# Patient Record
Sex: FEMALE
[De-identification: names, ages, dates, MRNs, and addresses within clinical notes are randomized per-mention and may not be internally consistent; named-entity substitution may affect disease eponyms.]

---

## 2018-11-29 ENCOUNTER — HOSPITAL ENCOUNTER (OUTPATIENT)
Dept: HOSPITAL 88 - ER | Age: 63
Setting detail: OBSERVATION
LOS: 2 days | Discharge: HOME | End: 2018-12-01
Attending: INTERNAL MEDICINE | Admitting: INTERNAL MEDICINE
Payer: COMMERCIAL

## 2018-11-29 VITALS — SYSTOLIC BLOOD PRESSURE: 175 MMHG | DIASTOLIC BLOOD PRESSURE: 74 MMHG

## 2018-11-29 VITALS — BODY MASS INDEX: 42.6 KG/M2 | HEIGHT: 59 IN | WEIGHT: 211.31 LBS

## 2018-11-29 DIAGNOSIS — I34.0: ICD-10-CM

## 2018-11-29 DIAGNOSIS — Z88.8: ICD-10-CM

## 2018-11-29 DIAGNOSIS — Z79.84: ICD-10-CM

## 2018-11-29 DIAGNOSIS — E78.1: ICD-10-CM

## 2018-11-29 DIAGNOSIS — E11.9: ICD-10-CM

## 2018-11-29 DIAGNOSIS — R91.1: ICD-10-CM

## 2018-11-29 DIAGNOSIS — I37.1: ICD-10-CM

## 2018-11-29 DIAGNOSIS — I25.110: Primary | ICD-10-CM

## 2018-11-29 DIAGNOSIS — E66.01: ICD-10-CM

## 2018-11-29 DIAGNOSIS — G47.00: ICD-10-CM

## 2018-11-29 DIAGNOSIS — E78.5: ICD-10-CM

## 2018-11-29 DIAGNOSIS — I10: ICD-10-CM

## 2018-11-29 DIAGNOSIS — K21.9: ICD-10-CM

## 2018-11-29 DIAGNOSIS — I07.1: ICD-10-CM

## 2018-11-29 DIAGNOSIS — Z79.02: ICD-10-CM

## 2018-11-29 LAB
ALBUMIN SERPL-MCNC: 3.4 G/DL (ref 3.5–5)
ALBUMIN/GLOB SERPL: 0.9 {RATIO} (ref 0.8–2)
ALP SERPL-CCNC: 59 IU/L (ref 40–150)
ALT SERPL-CCNC: 16 IU/L (ref 0–55)
ANION GAP SERPL CALC-SCNC: 13.2 MMOL/L (ref 8–16)
BACTERIA URNS QL MICRO: (no result) /HPF
BASOPHILS # BLD AUTO: 0.1 10*3/UL (ref 0–0.1)
BASOPHILS NFR BLD AUTO: 0.6 % (ref 0–1)
BILIRUB UR QL: NEGATIVE
BUN SERPL-MCNC: 25 MG/DL (ref 7–26)
BUN/CREAT SERPL: 28 (ref 6–25)
CALCIUM SERPL-MCNC: 9.3 MG/DL (ref 8.4–10.2)
CHLORIDE SERPL-SCNC: 103 MMOL/L (ref 98–107)
CK MB SERPL-MCNC: 0.9 NG/ML (ref 0–5)
CK SERPL-CCNC: 33 IU/L (ref 29–168)
CLARITY UR: CLEAR
CO2 SERPL-SCNC: 26 MMOL/L (ref 22–29)
COLOR UR: YELLOW
DEPRECATED APTT PLAS QN: 25.6 SECONDS (ref 23.8–35.5)
DEPRECATED INR PLAS: 0.92
DEPRECATED NEUTROPHILS # BLD AUTO: 4.7 10*3/UL (ref 2.1–6.9)
DEPRECATED RBC URNS MANUAL-ACNC: (no result) /HPF (ref 0–5)
EGFRCR SERPLBLD CKD-EPI 2021: > 60 ML/MIN (ref 60–?)
EOSINOPHIL # BLD AUTO: 0.2 10*3/UL (ref 0–0.4)
EOSINOPHIL NFR BLD AUTO: 2.3 % (ref 0–6)
EPI CELLS URNS QL MICRO: (no result) /LPF
ERYTHROCYTE [DISTWIDTH] IN CORD BLOOD: 12.5 % (ref 11.7–14.4)
GLOBULIN PLAS-MCNC: 3.6 G/DL (ref 2.3–3.5)
GLUCOSE SERPLBLD-MCNC: 116 MG/DL (ref 74–118)
HCT VFR BLD AUTO: 33.7 % (ref 34.2–44.1)
HGB BLD-MCNC: 11.5 G/DL (ref 12–16)
KETONES UR QL STRIP.AUTO: NEGATIVE
LEUKOCYTE ESTERASE UR QL STRIP.AUTO: NEGATIVE
LYMPHOCYTES # BLD: 3.1 10*3/UL (ref 1–3.2)
LYMPHOCYTES NFR BLD AUTO: 36.1 % (ref 18–39.1)
MAGNESIUM SERPL-MCNC: 1.3 MG/DL (ref 1.3–2.1)
MCH RBC QN AUTO: 31.2 PG (ref 28–32)
MCHC RBC AUTO-ENTMCNC: 34.1 G/DL (ref 31–35)
MCV RBC AUTO: 91.3 FL (ref 81–99)
MONOCYTES # BLD AUTO: 0.5 10*3/UL (ref 0.2–0.8)
MONOCYTES NFR BLD AUTO: 5.5 % (ref 4.4–11.3)
NEUTS SEG NFR BLD AUTO: 55.1 % (ref 38.7–80)
NITRITE UR QL STRIP.AUTO: NEGATIVE
PLATELET # BLD AUTO: 319 X10E3/UL (ref 140–360)
POTASSIUM SERPL-SCNC: 4.2 MMOL/L (ref 3.5–5.1)
PROT UR QL STRIP.AUTO: (no result)
PROTHROMBIN TIME: 13.2 SECONDS (ref 11.9–14.5)
RBC # BLD AUTO: 3.69 X10E6/UL (ref 3.6–5.1)
SODIUM SERPL-SCNC: 138 MMOL/L (ref 136–145)
SP GR UR STRIP: 1.02 (ref 1.01–1.02)
UROBILINOGEN UR STRIP-MCNC: 0.2 MG/DL (ref 0.2–1)
WBC #/AREA URNS HPF: (no result) /HPF (ref 0–5)

## 2018-11-29 PROCEDURE — 81001 URINALYSIS AUTO W/SCOPE: CPT

## 2018-11-29 PROCEDURE — 84484 ASSAY OF TROPONIN QUANT: CPT

## 2018-11-29 PROCEDURE — 71260 CT THORAX DX C+: CPT

## 2018-11-29 PROCEDURE — 85379 FIBRIN DEGRADATION QUANT: CPT

## 2018-11-29 PROCEDURE — 80061 LIPID PANEL: CPT

## 2018-11-29 PROCEDURE — 99284 EMERGENCY DEPT VISIT MOD MDM: CPT

## 2018-11-29 PROCEDURE — 80053 COMPREHEN METABOLIC PANEL: CPT

## 2018-11-29 PROCEDURE — 93005 ELECTROCARDIOGRAM TRACING: CPT

## 2018-11-29 PROCEDURE — 93306 TTE W/DOPPLER COMPLETE: CPT

## 2018-11-29 PROCEDURE — 82553 CREATINE MB FRACTION: CPT

## 2018-11-29 PROCEDURE — 93017 CV STRESS TEST TRACING ONLY: CPT

## 2018-11-29 PROCEDURE — 83735 ASSAY OF MAGNESIUM: CPT

## 2018-11-29 PROCEDURE — 71045 X-RAY EXAM CHEST 1 VIEW: CPT

## 2018-11-29 PROCEDURE — 87086 URINE CULTURE/COLONY COUNT: CPT

## 2018-11-29 PROCEDURE — 36415 COLL VENOUS BLD VENIPUNCTURE: CPT

## 2018-11-29 PROCEDURE — 85730 THROMBOPLASTIN TIME PARTIAL: CPT

## 2018-11-29 PROCEDURE — 82948 REAGENT STRIP/BLOOD GLUCOSE: CPT

## 2018-11-29 PROCEDURE — 96372 THER/PROPH/DIAG INJ SC/IM: CPT

## 2018-11-29 PROCEDURE — 85025 COMPLETE CBC W/AUTO DIFF WBC: CPT

## 2018-11-29 PROCEDURE — 92928 PRQ TCAT PLMT NTRAC ST 1 LES: CPT

## 2018-11-29 PROCEDURE — 85610 PROTHROMBIN TIME: CPT

## 2018-11-29 PROCEDURE — 83880 ASSAY OF NATRIURETIC PEPTIDE: CPT

## 2018-11-29 PROCEDURE — 93458 L HRT ARTERY/VENTRICLE ANGIO: CPT

## 2018-11-29 PROCEDURE — 82550 ASSAY OF CK (CPK): CPT

## 2018-11-29 NOTE — XMS REPORT
Patient Summary Document

                             Created on: 2018



SOMMER VARGAS

External Reference #: [id number]

: 1955

Sex: Female



Demographics







                          Address                   [address] [place] [address]

 

                          Home Phone                [phone]

 

                          Preferred Language        Unknown

 

                          Marital Status            Unknown

 

                          Catholic Affiliation     Unknown

 

                          Race                      Unknown

 

                                        Additional Race(s)  

 

                          Ethnic Group              Unknown





Author







                          Author                    [facility]

 

                          Address                   Unknown

 

                          Phone                     Unavailable







Care Team Providers







                    Care Team Member Name    Role                Phone

 

                    SWEET, A LAIRD      Unavailable         Unavailable







Problems

This patient has no known problems.



Allergies, Adverse Reactions, Alerts

This patient has no known allergies or adverse reactions.



Medications

This patient has no known medications.



Results







           Test Description    Test Time    Test Comments    Text Results    Atomic Results    Result

 Comments

 

                CT CHEST W      2018 21:50:00                                                                

                                          [facility]     
                  [address] [address]     
Patient Name: SOMMER VARGAS                                   MR #: [id number] 
                   : 1955                                   Age/Sex: 
63/F  Acct #: [id number]                              Req #: [id number]  Adm 
Physician:                                                      Ordered by: 
KENDAL OROPEZA MD                            Report #: [id number]        
Location: ER                                      Room/Bed:                     
___________________________________________________________________________________________________
   Procedure: [id number] CT/CT CHEST W  Exam Date: 18                      
     Exam Time:                                               REPORT STATUS:
Signed    EXAM: CT CHEST W   INDICATION:  Shortness of breath and positive d-
dimer   COMPARISON:  None   TECHNIQUE: Multidetector CT scanning of the chest 
was performed.  Coronal and   sagittal multiplanar reformations were obtained. 
Dose modulation, iterative   reconstruction, and/or weight based adjustment of 
the mA/kV was utilized to   reduce the radiation dose to as low as reasonably 
achievable. PE protocol   performed.   IV Contrast: 100 cc Isovue-370   CTDIvol 
has been reviewed. It is below the limits set by the Radiation Protocol   
Committee (RPC).      FINDINGS:   LUNGS AND AIRWAYS: The trachea and major 
bronchi are unremarkable.   Bilateral mosaic attenuation of the lungs, most 
likely related to air trapping.   At the medial aspect of the right lung base 
there is a 1.2 cm   consolidation/nodule.       PLEURA: No effusions or 
pneumothorax.      HEART, MEDIASTINUM, VESSELS: Mild cardiac enlargement. No 
abnormal pericardial   effusion. Atherosclerotic changes of the thoracic aorta 
without aneurysm. No   mediastinal mass or lymphadenopathy. The main pulmonary 
artery is within normal   size limits. No evidence of a pulmonary embolism.     
UPPER ABDOMEN: Cholecystectomy.      MUSCULOSKELETAL: No acute findings. Severe 
degenerative changes of the left   shoulder.      IMPRESSION:      1. No 
evidence of a pulmonary embolism.      2. At the medial aspect of the right lung
base there is an approximately 1.2 cm   consolidation/sub-solid nodule. This is 
indeterminate for being from prior   pneumonia or a potential cancer. A follow-
up CT of the chest without IV   contrast, low-dose protocol is recommended in 3 
months if there are no prior   CTs at an outside facility available for 
comparison.      Signed by: Dr. Bebe Dawn M.D. on 2018 10:00 PM  
     Dictated By: BEBE DAWN MD  Electronically Signed By: BEBE DAWN MD 
on 18  Transcribed By: ANTONIO on 18       COPY TO:   
KENDAL OROPEZA MD                         

 

                CHEST SINGLE (PORTABLE)    2018 21:12:00                                                   

                                                       [address] [place] [address]      Patient Name: SOMMER VARGAS                         
         MR #: [id number]                     : 1955                   
               Age/Sex: 63/F  Acct #: [id number]                              
Req #: [id number]  Adm Physician:                                               
      Ordered by: KENDAL OROPEZA MD                            Report #: [id number]
[id number]        Location: ER                                      Room/Bed:         
           
___________________________________________________________________________________________________
   Procedure: [id number] DX/CHEST SINGLE (PORTABLE)  Exam Date: 18         
                  Exam Time:                                               
REPORT STATUS: Signed    EXAM: CHEST SINGLE (PORTABLE), AP 1 view   INDICATION: 
Chest pain   COMPARISON: AP view of the chest 2015      FINDINGS:   
LINES/TUBES: None      LUNGS: No consolidations or edema.       PLEURA: No 
effusions or pneumothorax.      HEART AND MEDIASTINUM: Normal size and contour. 
    BONES AND SOFT TISSUES: No acute findings.       IMPRESSION:   No acute 
thoracic abnormality.            Signed by: Dr. Bebe Dawn M.D. on 
2018 9:13 PM        Dictated By: BEBE DAWN MD  Electronically Signed 
By: BEBE DAWN MD on 18  Transcribed By: ANTONIO on 18 
     COPY TO:   KENDAL OROPEZA MD

## 2018-11-29 NOTE — DIAGNOSTIC IMAGING REPORT
EXAM: CHEST SINGLE (PORTABLE), AP 1 view

INDICATION: Chest pain

COMPARISON: AP view of the chest March 4, 2015



FINDINGS:

LINES/TUBES: None



LUNGS: No consolidations or edema. 



PLEURA: No effusions or pneumothorax.



HEART AND MEDIASTINUM: Normal size and contour.



BONES AND SOFT TISSUES: No acute findings. 



IMPRESSION:

No acute thoracic abnormality.







Signed by: Dr. Susanna Riley M.D. on 11/29/2018 9:13 PM

## 2018-11-29 NOTE — DIAGNOSTIC IMAGING REPORT
EXAM: CT CHEST W

INDICATION:  Shortness of breath and positive d-dimer

COMPARISON:  None

TECHNIQUE: Multidetector CT scanning of the chest was performed.  Coronal and

sagittal multiplanar reformations were obtained. Dose modulation, iterative

reconstruction, and/or weight based adjustment of the mA/kV was utilized to

reduce the radiation dose to as low as reasonably achievable. PE protocol

performed.

IV Contrast: 100 cc Isovue-370

CTDIvol has been reviewed. It is below the limits set by the Radiation Protocol

Committee (RPC).



FINDINGS:

LUNGS AND AIRWAYS: The trachea and major bronchi are unremarkable.

Bilateral mosaic attenuation of the lungs, most likely related to air trapping.

At the medial aspect of the right lung base there is a 1.2 cm

consolidation/nodule. 



PLEURA: No effusions or pneumothorax.



HEART, MEDIASTINUM, VESSELS: Mild cardiac enlargement. No abnormal pericardial

effusion. Atherosclerotic changes of the thoracic aorta without aneurysm. No

mediastinal mass or lymphadenopathy. The main pulmonary artery is within normal

size limits. No evidence of a pulmonary embolism.



UPPER ABDOMEN: Cholecystectomy.



MUSCULOSKELETAL: No acute findings. Severe degenerative changes of the left

shoulder.



IMPRESSION:



1. No evidence of a pulmonary embolism.



2. At the medial aspect of the right lung base there is an approximately 1.2 cm

consolidation/sub-solid nodule. This is indeterminate for being from prior

pneumonia or a potential cancer. A follow-up CT of the chest without IV

contrast, low-dose protocol is recommended in 3 months if there are no prior

CTs at an outside facility available for comparison.



Signed by: Dr. Susanna Riley M.D. on 11/29/2018 10:00 PM

## 2018-11-30 VITALS — SYSTOLIC BLOOD PRESSURE: 181 MMHG | DIASTOLIC BLOOD PRESSURE: 78 MMHG

## 2018-11-30 VITALS — SYSTOLIC BLOOD PRESSURE: 183 MMHG | DIASTOLIC BLOOD PRESSURE: 91 MMHG

## 2018-11-30 VITALS — SYSTOLIC BLOOD PRESSURE: 142 MMHG | DIASTOLIC BLOOD PRESSURE: 72 MMHG

## 2018-11-30 VITALS — DIASTOLIC BLOOD PRESSURE: 68 MMHG | SYSTOLIC BLOOD PRESSURE: 168 MMHG

## 2018-11-30 VITALS — SYSTOLIC BLOOD PRESSURE: 191 MMHG | DIASTOLIC BLOOD PRESSURE: 82 MMHG

## 2018-11-30 VITALS — SYSTOLIC BLOOD PRESSURE: 159 MMHG | DIASTOLIC BLOOD PRESSURE: 84 MMHG

## 2018-11-30 VITALS — DIASTOLIC BLOOD PRESSURE: 72 MMHG | SYSTOLIC BLOOD PRESSURE: 164 MMHG

## 2018-11-30 VITALS — DIASTOLIC BLOOD PRESSURE: 74 MMHG | SYSTOLIC BLOOD PRESSURE: 170 MMHG

## 2018-11-30 VITALS — DIASTOLIC BLOOD PRESSURE: 89 MMHG | SYSTOLIC BLOOD PRESSURE: 167 MMHG

## 2018-11-30 VITALS — SYSTOLIC BLOOD PRESSURE: 184 MMHG | DIASTOLIC BLOOD PRESSURE: 79 MMHG

## 2018-11-30 VITALS — DIASTOLIC BLOOD PRESSURE: 74 MMHG | SYSTOLIC BLOOD PRESSURE: 175 MMHG

## 2018-11-30 VITALS — SYSTOLIC BLOOD PRESSURE: 153 MMHG | DIASTOLIC BLOOD PRESSURE: 74 MMHG

## 2018-11-30 VITALS — DIASTOLIC BLOOD PRESSURE: 83 MMHG | SYSTOLIC BLOOD PRESSURE: 158 MMHG

## 2018-11-30 VITALS — DIASTOLIC BLOOD PRESSURE: 79 MMHG | SYSTOLIC BLOOD PRESSURE: 172 MMHG

## 2018-11-30 VITALS — SYSTOLIC BLOOD PRESSURE: 178 MMHG | DIASTOLIC BLOOD PRESSURE: 139 MMHG

## 2018-11-30 VITALS — SYSTOLIC BLOOD PRESSURE: 192 MMHG | DIASTOLIC BLOOD PRESSURE: 76 MMHG

## 2018-11-30 VITALS — DIASTOLIC BLOOD PRESSURE: 87 MMHG | SYSTOLIC BLOOD PRESSURE: 188 MMHG

## 2018-11-30 VITALS — SYSTOLIC BLOOD PRESSURE: 169 MMHG | DIASTOLIC BLOOD PRESSURE: 72 MMHG

## 2018-11-30 LAB
CHOLEST SERPL-MCNC: 211 MD/DL (ref 0–199)
CHOLEST/HDLC SERPL: 6.4 {RATIO} (ref 3–3.6)
CK MB SERPL-MCNC: 0.6 NG/ML (ref 0–5)
CK SERPL-CCNC: 26 IU/L (ref 29–168)
HDLC SERPL-MSCNC: 33 MG/DL (ref 40–60)
LDLC SERPL CALC-MCNC: 130 MG/DL (ref 60–130)
TRIGL SERPL-MCNC: 240 MG/DL (ref 0–149)

## 2018-11-30 RX ADMIN — Medication PRN MG: at 18:03

## 2018-11-30 RX ADMIN — PANTOPRAZOLE SODIUM SCH MG: 40 GRANULE, DELAYED RELEASE ORAL at 13:33

## 2018-11-30 RX ADMIN — INSULIN LISPRO SCH UNIT: 100 INJECTION, SOLUTION INTRAVENOUS; SUBCUTANEOUS at 22:20

## 2018-11-30 RX ADMIN — INSULIN LISPRO SCH UNIT: 100 INJECTION, SOLUTION INTRAVENOUS; SUBCUTANEOUS at 07:56

## 2018-11-30 RX ADMIN — SODIUM CHLORIDE PRN MG: 900 INJECTION INTRAVENOUS at 01:43

## 2018-11-30 RX ADMIN — INSULIN LISPRO SCH UNIT: 100 INJECTION, SOLUTION INTRAVENOUS; SUBCUTANEOUS at 13:32

## 2018-11-30 RX ADMIN — SODIUM CHLORIDE SCH MLS/HR: 9 INJECTION, SOLUTION INTRAVENOUS at 15:22

## 2018-11-30 RX ADMIN — SODIUM CHLORIDE SCH MLS/HR: 9 INJECTION, SOLUTION INTRAVENOUS at 13:32

## 2018-11-30 RX ADMIN — INSULIN LISPRO SCH UNIT: 100 INJECTION, SOLUTION INTRAVENOUS; SUBCUTANEOUS at 16:30

## 2018-11-30 RX ADMIN — Medication PRN MG: at 23:17

## 2018-11-30 RX ADMIN — PANTOPRAZOLE SODIUM SCH MG: 40 GRANULE, DELAYED RELEASE ORAL at 15:21

## 2018-11-30 RX ADMIN — SODIUM CHLORIDE SCH MLS/HR: 9 INJECTION, SOLUTION INTRAVENOUS at 21:00

## 2018-11-30 RX ADMIN — Medication PRN MG: at 01:50

## 2018-11-30 RX ADMIN — SODIUM CHLORIDE PRN MG: 900 INJECTION INTRAVENOUS at 18:03

## 2018-11-30 NOTE — CARDIOLOGY REPORT
DATE OF STUDY:  November 30, 2018



EXERCISE TREADMILL STRESS TEST



INDICATION FOR STUDY:  Chest pain, escalating symptoms concerning for 

perhaps unstable angina.



TECHNICAL DETAILS:  After risks, benefits, pros and cons of today's 

exercise treadmill stress test were explained to the patient, the patient 

agreed to proceed.  Patient was brought to the stress lab where 12-lead EKG 

monitoring and blood pressure monitoring were obtained.  She exercised on a 

Bryon protocol for a total duration of 1 minute and 50 seconds and had to 

be terminated sub-max due to severe onset of chest pain, pressure similar 

to presenting complaint.  Underlying EKG revealed normal sinus rhythm, 

right bundle branch block, and heart rate increased from a baseline of 79 

beats per minute to a maximum of 98 beats per minute well below our target 

heart rate goal.  Blood pressure was only obtained at baseline, which was 

noted to be 126/78.  Repeat blood pressure was not done during exercise.  

At the end of stress test, patient had borderline ST depressions in V5-V6 

and again intense chest pain typical of angina.



CONCLUSION

1. Abnormal exercise treadmill stress test, sub-max protocol, with the 

development of typical chest pain symptoms concerning for angina, which 

brought her into the hospital.

2. Borderline ischemic EKG changes and a very sub-max stress test.

3. In light of high risk features, we will proceed with cardiac 

catheterization.









DD:  11/30/2018 19:58

DT:  11/30/2018 21:27

Job#:  [id number] GIOVANNI

## 2018-11-30 NOTE — CONSULTATION
DATE OF CONSULTATION:  2018 



REASON FOR CONSULTATION:  Chest pain.



This is a 63-year-old female with a history of diabetes, hypertension, 

reflux, and insomnia.  Patient presents to Patients ER with complaints of 

chest pain times 1 week.  However, on  the chest pain became 

constant, pressure in nature with some shortness of breath.  Cardiology was 

consulted to evaluate the patient.  Patient was seen in the ER.  Reports 

the chest pain started about a week ago when she was on vacation and states 

that it is pressure sensation, worse with exertion.  However, on  

reports chest pain became constant pressure worse with deep breathing.  

Therefore, came to the ER for further evaluation.  Initial enzymes negative 

times 2.  EKG sinus rhythm with right bundle branch block.  CT was done PE 

protocol, which was negative.  Echo has been ordered and is pending.  Of 

note, the patient had a heart cath several years ago with normal 

coronaries.  Patient is very concerned and is wanting further evaluation 

given her strong family history of heart disease.



PAST MEDICAL HISTORY:  Diabetes, hypertension, GERD, and insomnia.



SURGICAL HISTORY:  Hysterectomy, cholecystectomy, umbilical hernia, 

appendectomy.  



FAMILY HISTORY:  Mother  at age 92 apparently with uterine cancer.  

Father  in his 50s apparently from MI.  



SOCIAL HISTORY:  She is .  She has 2 children.  Reports they are 

healthy.  No alcohol use.  No tobacco use.  



HOME MEDICATIONS

1.  Protonix 40 mg once a day.  

2.  Metformin 500 mg t.i.d. 

3.  Ambien p.r.n. 

4.  Nifedipine 30 mg once a day.



ALLERGIES:  LISINOPRIL.



REVIEW OF SYSTEMS

GENERAL:  Denies any weight changes, any fevers, chills, night sweats, or 

any skin contacts.

SKIN:  No rashes or sores. 

HEENT:  No nausea, vomiting, vision changes, blurred vision, double vision, 

tinnitus, earaches, epistaxis.

CARDIAC:  Positive for chest pain.  Positive for dyspnea on exertion 

worsening of several days.  Denies any orthopnea, PND, intermittent lower 

extremity edema.  

RESPIRATORY:  Positive shortness of breath.  No hemoptysis.

GI:  Good appetite.  No nausea.  No vomiting.  No diarrhea.  No 

constipation.  Denies any melena or hematochezia.  

URINARY:  Denies any frequency, urgency, nocturia, or dysuria.  

VASCULAR:  Reports intermittent lower extremity edema, left greater than 

right.  Denies any claudication also.  

MUSCULOSKELETAL:  Denies any back pain.  However, generalized joint pains.  



NEUROLOGIC:  Denies any tingling, tremors, paralysis, blackouts, seizures.

HEMATOLOGY:  Denies anemia, any bruising.  

ENDOCRINE:  Denies any heat or cold intolerance, any polyuria, polydipsia, 

or polyphagia. 



PHYSICAL EXAMINATION 

VITALS:  Height 59 inches, weight 208 pounds.  BMI 42.  Vital signs 

currently are heart rate 70, temperature 98.4, respiratory rate 18, blood 

pressure 126/50, pulse ox 100%, on room air.

GENERAL:  Appears stated age and reliable informant.  In no acute distress. 

 

SKIN:  No rashes or bruises noted. 

HEENT:  Normocephalic.  Pupils are equal and reactive.  Extraocular motor 

intact.  Trachea midline.  No thyromegaly noted.  No JVD.  No carotid 

bruits.  Oral mucosa pink. 

HEART:  Regular rate and rhythm.  

LUNGS:  Bilateral breath sounds clear to auscultation. 

ABDOMEN:  Soft, nontender and nondistended.  No organomegaly.  However, the 

patient is very obese.  

EXTREMITIES:  Muscle tone and good muscle strength throughout.  

VASCULAR:  Plus 2 bilateral radial pulses and +1 DP and PT pulses 

bilaterally.  

NEUROLOGIC:  Cranial nerves II-XII seem intact.



LABS:  Hemoglobin 11, hematocrit 33 and platelets 319,000.  Sodium 138, 

potassium 4.2,  chloride 103, BUN 25, creatinine 0.8.  Troponin 0.036, next 

0.018.  BNP 98.  EKG is normal sinus rhythm with right bundle branch block. 

 Chest x-ray with no PE.  However, showing a right lower lung base 1.2-cm 

nodule.  Chest x-ray with no acute abnormalities noted.



ASSESSMENT AND PLAN 

1.  Chest pain.  

2.  Pulmonary nodule.  

3.  Diabetes.

4.  Obesity.

5.  Hyperlipidemia.  



PLAN:  The patient presents with chest pain for about a week.  Now, reports 

it is constant with mixed features.  Enzymes negative times 2 thus far and 

EKG without any acute changes suggestive of acute event.  The patient is 

requesting further evaluation.  Patient very concerned.  Will proceed with 

a stress test to evaluate further.  _______ has been ordered and will be 

reviewed by cardiology attending.  The patient is aware of the CT report 

and copy has been given to the patient in which she was advised to follow 

up as recommended.  Continue home medications.  Further recommendations as 

clinical course dictates.  





Thank you very much for this consult.



DICTATED BY CONCHITA BARBOZA NP

Seen and evaluated

Agree with note

Since enzymes are negative , we will do stress test

Stress test is positive with chest pain at 2 minutes of excercise ===> will 
schedule cardiac cath / PCI, 



 





DD:  2018 12:38

DT:  2018 13:30

Job#:  [id number] RI







MTDD

## 2018-12-01 VITALS — SYSTOLIC BLOOD PRESSURE: 158 MMHG | DIASTOLIC BLOOD PRESSURE: 79 MMHG

## 2018-12-01 VITALS — DIASTOLIC BLOOD PRESSURE: 57 MMHG | SYSTOLIC BLOOD PRESSURE: 144 MMHG

## 2018-12-01 VITALS — SYSTOLIC BLOOD PRESSURE: 143 MMHG | DIASTOLIC BLOOD PRESSURE: 75 MMHG

## 2018-12-01 VITALS — SYSTOLIC BLOOD PRESSURE: 154 MMHG | DIASTOLIC BLOOD PRESSURE: 74 MMHG

## 2018-12-01 VITALS — DIASTOLIC BLOOD PRESSURE: 72 MMHG | SYSTOLIC BLOOD PRESSURE: 145 MMHG

## 2018-12-01 VITALS — DIASTOLIC BLOOD PRESSURE: 69 MMHG | SYSTOLIC BLOOD PRESSURE: 142 MMHG

## 2018-12-01 VITALS — DIASTOLIC BLOOD PRESSURE: 61 MMHG | SYSTOLIC BLOOD PRESSURE: 150 MMHG

## 2018-12-01 VITALS — DIASTOLIC BLOOD PRESSURE: 72 MMHG | SYSTOLIC BLOOD PRESSURE: 149 MMHG

## 2018-12-01 VITALS — DIASTOLIC BLOOD PRESSURE: 81 MMHG | SYSTOLIC BLOOD PRESSURE: 149 MMHG

## 2018-12-01 VITALS — DIASTOLIC BLOOD PRESSURE: 60 MMHG | SYSTOLIC BLOOD PRESSURE: 137 MMHG

## 2018-12-01 VITALS — DIASTOLIC BLOOD PRESSURE: 80 MMHG | SYSTOLIC BLOOD PRESSURE: 152 MMHG

## 2018-12-01 VITALS — SYSTOLIC BLOOD PRESSURE: 159 MMHG | DIASTOLIC BLOOD PRESSURE: 68 MMHG

## 2018-12-01 VITALS — DIASTOLIC BLOOD PRESSURE: 77 MMHG | SYSTOLIC BLOOD PRESSURE: 159 MMHG

## 2018-12-01 VITALS — SYSTOLIC BLOOD PRESSURE: 145 MMHG | DIASTOLIC BLOOD PRESSURE: 71 MMHG

## 2018-12-01 VITALS — DIASTOLIC BLOOD PRESSURE: 63 MMHG | SYSTOLIC BLOOD PRESSURE: 137 MMHG

## 2018-12-01 VITALS — DIASTOLIC BLOOD PRESSURE: 93 MMHG | SYSTOLIC BLOOD PRESSURE: 165 MMHG

## 2018-12-01 VITALS — SYSTOLIC BLOOD PRESSURE: 136 MMHG | DIASTOLIC BLOOD PRESSURE: 68 MMHG

## 2018-12-01 VITALS — DIASTOLIC BLOOD PRESSURE: 72 MMHG | SYSTOLIC BLOOD PRESSURE: 160 MMHG

## 2018-12-01 VITALS — SYSTOLIC BLOOD PRESSURE: 153 MMHG | DIASTOLIC BLOOD PRESSURE: 69 MMHG

## 2018-12-01 VITALS — DIASTOLIC BLOOD PRESSURE: 68 MMHG | SYSTOLIC BLOOD PRESSURE: 139 MMHG

## 2018-12-01 VITALS — DIASTOLIC BLOOD PRESSURE: 57 MMHG | SYSTOLIC BLOOD PRESSURE: 87 MMHG

## 2018-12-01 VITALS — DIASTOLIC BLOOD PRESSURE: 65 MMHG | SYSTOLIC BLOOD PRESSURE: 156 MMHG

## 2018-12-01 VITALS — SYSTOLIC BLOOD PRESSURE: 155 MMHG | DIASTOLIC BLOOD PRESSURE: 63 MMHG

## 2018-12-01 VITALS — SYSTOLIC BLOOD PRESSURE: 164 MMHG | DIASTOLIC BLOOD PRESSURE: 68 MMHG

## 2018-12-01 VITALS — SYSTOLIC BLOOD PRESSURE: 138 MMHG | DIASTOLIC BLOOD PRESSURE: 59 MMHG

## 2018-12-01 VITALS — SYSTOLIC BLOOD PRESSURE: 142 MMHG | DIASTOLIC BLOOD PRESSURE: 66 MMHG

## 2018-12-01 VITALS — DIASTOLIC BLOOD PRESSURE: 76 MMHG | SYSTOLIC BLOOD PRESSURE: 148 MMHG

## 2018-12-01 VITALS — SYSTOLIC BLOOD PRESSURE: 164 MMHG | DIASTOLIC BLOOD PRESSURE: 71 MMHG

## 2018-12-01 VITALS — SYSTOLIC BLOOD PRESSURE: 142 MMHG | DIASTOLIC BLOOD PRESSURE: 67 MMHG

## 2018-12-01 VITALS — SYSTOLIC BLOOD PRESSURE: 155 MMHG | DIASTOLIC BLOOD PRESSURE: 68 MMHG

## 2018-12-01 VITALS — SYSTOLIC BLOOD PRESSURE: 169 MMHG | DIASTOLIC BLOOD PRESSURE: 86 MMHG

## 2018-12-01 VITALS — DIASTOLIC BLOOD PRESSURE: 68 MMHG | SYSTOLIC BLOOD PRESSURE: 137 MMHG

## 2018-12-01 VITALS — DIASTOLIC BLOOD PRESSURE: 74 MMHG | SYSTOLIC BLOOD PRESSURE: 154 MMHG

## 2018-12-01 VITALS — DIASTOLIC BLOOD PRESSURE: 75 MMHG | SYSTOLIC BLOOD PRESSURE: 167 MMHG

## 2018-12-01 VITALS — SYSTOLIC BLOOD PRESSURE: 150 MMHG | DIASTOLIC BLOOD PRESSURE: 70 MMHG

## 2018-12-01 VITALS — SYSTOLIC BLOOD PRESSURE: 146 MMHG | DIASTOLIC BLOOD PRESSURE: 69 MMHG

## 2018-12-01 VITALS — SYSTOLIC BLOOD PRESSURE: 144 MMHG | DIASTOLIC BLOOD PRESSURE: 77 MMHG

## 2018-12-01 VITALS — SYSTOLIC BLOOD PRESSURE: 147 MMHG | DIASTOLIC BLOOD PRESSURE: 77 MMHG

## 2018-12-01 LAB
ALBUMIN SERPL-MCNC: 3 G/DL (ref 3.5–5)
ALBUMIN/GLOB SERPL: 0.9 {RATIO} (ref 0.8–2)
ALP SERPL-CCNC: 55 IU/L (ref 40–150)
ALT SERPL-CCNC: 14 IU/L (ref 0–55)
ANION GAP SERPL CALC-SCNC: 11 MMOL/L (ref 8–16)
BASOPHILS # BLD AUTO: 0.1 10*3/UL (ref 0–0.1)
BASOPHILS NFR BLD AUTO: 0.6 % (ref 0–1)
BUN SERPL-MCNC: 16 MG/DL (ref 7–26)
BUN/CREAT SERPL: 22 (ref 6–25)
CALCIUM SERPL-MCNC: 8.3 MG/DL (ref 8.4–10.2)
CHLORIDE SERPL-SCNC: 109 MMOL/L (ref 98–107)
CO2 SERPL-SCNC: 24 MMOL/L (ref 22–29)
DEPRECATED NEUTROPHILS # BLD AUTO: 5.4 10*3/UL (ref 2.1–6.9)
EGFRCR SERPLBLD CKD-EPI 2021: > 60 ML/MIN (ref 60–?)
EOSINOPHIL # BLD AUTO: 0.2 10*3/UL (ref 0–0.4)
EOSINOPHIL NFR BLD AUTO: 1.8 % (ref 0–6)
ERYTHROCYTE [DISTWIDTH] IN CORD BLOOD: 12.6 % (ref 11.7–14.4)
GLOBULIN PLAS-MCNC: 3.2 G/DL (ref 2.3–3.5)
GLUCOSE SERPLBLD-MCNC: 106 MG/DL (ref 74–118)
HCT VFR BLD AUTO: 29.8 % (ref 34.2–44.1)
HGB BLD-MCNC: 10.1 G/DL (ref 12–16)
LYMPHOCYTES # BLD: 2.2 10*3/UL (ref 1–3.2)
LYMPHOCYTES NFR BLD AUTO: 26.2 % (ref 18–39.1)
MCH RBC QN AUTO: 31.5 PG (ref 28–32)
MCHC RBC AUTO-ENTMCNC: 33.9 G/DL (ref 31–35)
MCV RBC AUTO: 92.8 FL (ref 81–99)
MONOCYTES # BLD AUTO: 0.5 10*3/UL (ref 0.2–0.8)
MONOCYTES NFR BLD AUTO: 5.6 % (ref 4.4–11.3)
NEUTS SEG NFR BLD AUTO: 65.3 % (ref 38.7–80)
PLATELET # BLD AUTO: 264 X10E3/UL (ref 140–360)
POTASSIUM SERPL-SCNC: 4 MMOL/L (ref 3.5–5.1)
RBC # BLD AUTO: 3.21 X10E6/UL (ref 3.6–5.1)
SODIUM SERPL-SCNC: 140 MMOL/L (ref 136–145)

## 2018-12-01 RX ADMIN — SODIUM CHLORIDE SCH MLS/HR: 9 INJECTION, SOLUTION INTRAVENOUS at 06:05

## 2018-12-01 RX ADMIN — INSULIN LISPRO SCH UNIT: 100 INJECTION, SOLUTION INTRAVENOUS; SUBCUTANEOUS at 07:30

## 2018-12-01 RX ADMIN — PANTOPRAZOLE SODIUM SCH MG: 40 GRANULE, DELAYED RELEASE ORAL at 08:01

## 2018-12-01 RX ADMIN — INSULIN LISPRO SCH UNIT: 100 INJECTION, SOLUTION INTRAVENOUS; SUBCUTANEOUS at 11:30

## 2018-12-01 NOTE — DISCHARGE SUMMARY
PRIMARY CARE DOCTOR:  Dr. Dawna Rosa with [facility].



FINAL DIAGNOSIS:  Unstable angina with coronary artery disease.



SECONDARY DIAGNOSES

1. Diabetes.

2. Lung nodule.

3. Morbid obesity.

4. Hypertriglyceridemia with triglyceride of 240.



CONSULTANT:  Dr. Tiwari, cardiology.



PROCEDURES/STUDIES PERFORMED

CT of the chest with IV contrast.

Stress test.

Echocardiogram.

Left heart cath.



HISTORY:  Per H and P.



HOSPITAL COURSE:  Patient was admitted.  CT scan was done, which was 

negative for PE.  However, there is a 1.2 mm nodule.  I have printed the 

report out and she supposed to give it to her primary care doctor for 

followup.  The recommendation is to repeat another CAT scan in 3 months.  

Patient did not have an acute myocardial infarction.  Her troponins were 

negative.  Patient underwent stress test, which was normal; therefore 

subsequently, patient underwent left heart cath through her right radial 

artery.  A drug-eluting stent was placed in her LAD.  Patient is to go home 

today on aspirin, Plavix, metoprolol, and Lipitor.  Patient will follow up 

with her PCP in 1 week.  I have updated her PCP about this hospitalization. 

 Patient was seen and examined today.  It took 32 minutes total to 

discharge this patient.



CONDITION ON DISCHARGE:  Stable.



DISCHARGE MEDICATIONS:  Please see medication reconciliation form.







 _________________________________

CEE STILL M.D.



DD:  12/01/2018 12:28

DT:  12/01/2018 12:38

Job#:  [id number] DAPHNE



cc:DAWNA ROSA MD

## 2018-12-01 NOTE — OPERATIVE REPORT
DATE OF PROCEDURE:    



CARDIAC CATHETERIZATION INTERVENTION

 

PROCEDURES PERFORMED

1. Left heart cardiac catheterization with coronary angiography.

2. Percutaneous coronary intervention with drug-eluting stent placement 

to the prox-mid LAD culprit lesion.



INDICATION FOR PROCEDURE:  A 63-year-old morbidly obese lady who presents 

to this institution with chest pain symptoms concerning for unstable 

angina.  She underwent exercise treadmill stress test and was significantly 

positive and very sub-max protocol with poor exercise tolerance and hence a 

high risk stress test.  We decided to proceed for definite ischemic 

evaluation.



DESCRIPTION OF PROCEDURE:  After risks, benefits, pros and cons of today's 

procedure were explained, patient agreed to proceed.  Patient was brought 

to the cardiac catheterization laboratory, where the right wrist was 

prepped and draped in the usual sterile fashion.  Lidocaine solution 1% was 

used to numb the right wrist region.  Access to the right radial artery was 

obtained and a long 5-[place] Terumo Glidesheath was placed.  After the 

sheath was placed, we gave intra-arterial verapamil 2.5 mg and 

nitroglycerin 200 mcg through the sheath and 3000 units of intra-arterial 

heparin was given.  Selective coronary angiography of the native left and 

right coronary arteries was performed with 5-[place] Ignacio 4.0 diagnostic 

catheter.  This revealed a short left main, dual origin LAD and circumflex 

artery with a posterior takeoff and there was interestingly a 70% to 80% 

hazy prox-mid LAD stenosis concerning for acute thrombotic stenosis and 

clear cut culprit artery.  We decided to proceed with intervention.  A 

5-[place] radial sheath was upsized to a 6-[place] slender radial sheath.  IV 

Angiomax bolus was given for systemic anticoagulation, patient was loaded 

with aspirin and Plavix and Integrilin boluses were given as well.  We went 

ahead with a 6-[place] XP 3.5 LAD guiding catheter and selected the LAD 

artery.  Utilizing a 300 cm [place] XT Guidewire was successfully crossed 

into the apical all the way into the distal LAD well passed the lesion.  We 

went in with a direct stenting strategy with a Synergy 3.0 x 20 mm 

drug-eluting stent.  We deployed up to 17 atmospheres of pressure.  Final 

angiography revealed 0% residual stenosis CHANCE III flow and no 

complications.  At the conclusion of the case, the guiding catheter was 

removed with a J-wire and a Terumo TR band was successfully deployed 

utilizing patent hemostasis technique and a total of 14 mL of air was 

placed.  



COMPLICATIONS:  None.



ESTIMATED BLOOD LOSS:  Minimal.



FINDINGS

1. Left main is very short, gives rise to an LAD and circumflex branch 

and is angiographically normal.

2. LAD has a long hazy 70% to 80% prox-mid stenosis which terminates 

into a bifurcating LAD diagonal and first septal  branch.  The 

remainder of this vessel and its branches with just mild luminal 

irregularities.

3. Left circumflex artery is a large codominant, gives rise to a large 

anterolateral marginal branch, two moderate caliber marginal branches 

and a large posterolateral/left PDA branch.  This vessel and its 

branches were with just mild luminal irregularities.  

4. RCA is dominant, has a little anterior takeoff, gives rise to a right 

PDA and right PLV branch.  There was a 40% mid RCA stenosis.



INTERVENTION SUMMARY:  Successful treatment of the 70% to 80% hazy prox-mid 

LAD thrombotic lesion with implantation of a Synergy 3.0 x 20 drug-eluting 

stent resulting in 0% residual stenosis CHANCE III flow and no complications.



PLAN/RECOMMENDATIONS

1. Aspirin and Plavix therapy.

2. Statin therapy.

3. Recommend just aggressive risk-factor modification medical therapy.

4. Integrilin infusion.





1. Removal of TR band in approximately 2 hours.











DD:  11/30/2018 20:03

DT:  12/01/2018 00:47

Job#:  [id number] VAS